# Patient Record
Sex: MALE | Race: AMERICAN INDIAN OR ALASKA NATIVE
[De-identification: names, ages, dates, MRNs, and addresses within clinical notes are randomized per-mention and may not be internally consistent; named-entity substitution may affect disease eponyms.]

---

## 2020-04-01 ENCOUNTER — HOSPITAL ENCOUNTER (EMERGENCY)
Dept: HOSPITAL 5 - ED | Age: 30
Discharge: LEFT BEFORE DISCHARGE | End: 2020-04-01
Payer: SELF-PAY

## 2020-04-01 VITALS — SYSTOLIC BLOOD PRESSURE: 141 MMHG | DIASTOLIC BLOOD PRESSURE: 87 MMHG

## 2020-04-01 DIAGNOSIS — Z53.21: ICD-10-CM

## 2020-04-01 DIAGNOSIS — R05: Primary | ICD-10-CM

## 2020-04-01 PROCEDURE — 71046 X-RAY EXAM CHEST 2 VIEWS: CPT

## 2020-04-01 NOTE — XRAY REPORT
CHEST PA AND LATERAL VIEWS



INDICATION: 

SOB, cough.



COMPARISON: 

None.



FINDINGS:



Support devices: None.



Heart: Within normal limits. 



Lungs/Pleura: No acute pulmonary or pleural findings.  







IMPRESSION:

1. No acute findings.



Signer Name: Mark Garcia MD 

Signed: 4/1/2020 4:31 PM

Workstation Name: [id number]